# Patient Record
Sex: MALE | Race: WHITE | Employment: OTHER | URBAN - METROPOLITAN AREA
[De-identification: names, ages, dates, MRNs, and addresses within clinical notes are randomized per-mention and may not be internally consistent; named-entity substitution may affect disease eponyms.]

---

## 2017-08-22 PROBLEM — R00.1 BRADYCARDIA: Status: ACTIVE | Noted: 2017-08-22

## 2018-07-03 PROBLEM — I48.0 PAROXYSMAL ATRIAL FIBRILLATION (HCC): Status: ACTIVE | Noted: 2018-07-03

## 2018-09-04 ENCOUNTER — HOSPITAL ENCOUNTER (OUTPATIENT)
Facility: HOSPITAL | Age: 78
Setting detail: HOSPITAL OUTPATIENT SURGERY
Discharge: HOME | End: 2018-09-04
Attending: ORTHOPAEDIC SURGERY | Admitting: ORTHOPAEDIC SURGERY
Payer: MEDICARE

## 2018-09-04 ENCOUNTER — SURGERY (OUTPATIENT)
Age: 78
End: 2018-09-04

## 2018-09-04 ENCOUNTER — ANESTHESIA (OUTPATIENT)
Dept: SURGERY | Facility: HOSPITAL | Age: 78
End: 2018-09-04
Payer: MEDICARE

## 2018-09-04 ENCOUNTER — ANESTHESIA EVENT (OUTPATIENT)
Dept: SURGERY | Facility: HOSPITAL | Age: 78
End: 2018-09-04
Payer: MEDICARE

## 2018-09-04 VITALS
SYSTOLIC BLOOD PRESSURE: 124 MMHG | RESPIRATION RATE: 15 BRPM | TEMPERATURE: 97 F | HEART RATE: 65 BPM | HEIGHT: 71 IN | OXYGEN SATURATION: 95 % | DIASTOLIC BLOOD PRESSURE: 55 MMHG | WEIGHT: 215 LBS | BODY MASS INDEX: 30.1 KG/M2

## 2018-09-04 PROCEDURE — 01U50KZ SUPPLEMENT MEDIAN NERVE WITH NONAUTOLOGOUS TISSUE SUBSTITUTE, OPEN APPROACH: ICD-10-PCS | Performed by: ORTHOPAEDIC SURGERY

## 2018-09-04 PROCEDURE — 01N50ZZ RELEASE MEDIAN NERVE, OPEN APPROACH: ICD-10-PCS | Performed by: ORTHOPAEDIC SURGERY

## 2018-09-04 PROCEDURE — 88304 TISSUE EXAM BY PATHOLOGIST: CPT | Performed by: ORTHOPAEDIC SURGERY

## 2018-09-04 DEVICE — IMPLANTABLE DEVICE: Type: IMPLANTABLE DEVICE | Site: HAND | Status: FUNCTIONAL

## 2018-09-04 RX ORDER — FAMOTIDINE 20 MG/1
20 TABLET ORAL ONCE
Status: COMPLETED | OUTPATIENT
Start: 2018-09-04 | End: 2018-09-04

## 2018-09-04 RX ORDER — HYDROCODONE BITARTRATE AND ACETAMINOPHEN 5; 325 MG/1; MG/1
2 TABLET ORAL AS NEEDED
Status: DISCONTINUED | OUTPATIENT
Start: 2018-09-04 | End: 2018-09-04

## 2018-09-04 RX ORDER — NALOXONE HYDROCHLORIDE 0.4 MG/ML
80 INJECTION, SOLUTION INTRAMUSCULAR; INTRAVENOUS; SUBCUTANEOUS AS NEEDED
Status: DISCONTINUED | OUTPATIENT
Start: 2018-09-04 | End: 2018-09-04

## 2018-09-04 RX ORDER — LIDOCAINE HYDROCHLORIDE AND EPINEPHRINE 10; 10 MG/ML; UG/ML
INJECTION, SOLUTION INFILTRATION; PERINEURAL AS NEEDED
Status: DISCONTINUED | OUTPATIENT
Start: 2018-09-04 | End: 2018-09-04 | Stop reason: HOSPADM

## 2018-09-04 RX ORDER — MORPHINE SULFATE 10 MG/ML
6 INJECTION, SOLUTION INTRAMUSCULAR; INTRAVENOUS EVERY 10 MIN PRN
Status: DISCONTINUED | OUTPATIENT
Start: 2018-09-04 | End: 2018-09-04

## 2018-09-04 RX ORDER — SODIUM CHLORIDE, SODIUM LACTATE, POTASSIUM CHLORIDE, CALCIUM CHLORIDE 600; 310; 30; 20 MG/100ML; MG/100ML; MG/100ML; MG/100ML
INJECTION, SOLUTION INTRAVENOUS CONTINUOUS
Status: DISCONTINUED | OUTPATIENT
Start: 2018-09-04 | End: 2018-09-04

## 2018-09-04 RX ORDER — CEFAZOLIN SODIUM/WATER 2 G/20 ML
2 SYRINGE (ML) INTRAVENOUS ONCE
Status: COMPLETED | OUTPATIENT
Start: 2018-09-04 | End: 2018-09-04

## 2018-09-04 RX ORDER — ONDANSETRON 2 MG/ML
INJECTION INTRAMUSCULAR; INTRAVENOUS AS NEEDED
Status: DISCONTINUED | OUTPATIENT
Start: 2018-09-04 | End: 2018-09-04 | Stop reason: SURG

## 2018-09-04 RX ORDER — HYDROCODONE BITARTRATE AND ACETAMINOPHEN 5; 325 MG/1; MG/1
1 TABLET ORAL AS NEEDED
Status: DISCONTINUED | OUTPATIENT
Start: 2018-09-04 | End: 2018-09-04

## 2018-09-04 RX ORDER — LIDOCAINE HYDROCHLORIDE 10 MG/ML
INJECTION, SOLUTION EPIDURAL; INFILTRATION; INTRACAUDAL; PERINEURAL AS NEEDED
Status: DISCONTINUED | OUTPATIENT
Start: 2018-09-04 | End: 2018-09-04 | Stop reason: SURG

## 2018-09-04 RX ORDER — DEXAMETHASONE SODIUM PHOSPHATE 4 MG/ML
VIAL (ML) INJECTION AS NEEDED
Status: DISCONTINUED | OUTPATIENT
Start: 2018-09-04 | End: 2018-09-04 | Stop reason: SURG

## 2018-09-04 RX ORDER — MIDAZOLAM HYDROCHLORIDE 1 MG/ML
INJECTION INTRAMUSCULAR; INTRAVENOUS AS NEEDED
Status: DISCONTINUED | OUTPATIENT
Start: 2018-09-04 | End: 2018-09-04 | Stop reason: SURG

## 2018-09-04 RX ORDER — MORPHINE SULFATE 4 MG/ML
2 INJECTION, SOLUTION INTRAMUSCULAR; INTRAVENOUS EVERY 10 MIN PRN
Status: DISCONTINUED | OUTPATIENT
Start: 2018-09-04 | End: 2018-09-04

## 2018-09-04 RX ORDER — ONDANSETRON 2 MG/ML
4 INJECTION INTRAMUSCULAR; INTRAVENOUS ONCE AS NEEDED
Status: DISCONTINUED | OUTPATIENT
Start: 2018-09-04 | End: 2018-09-04

## 2018-09-04 RX ORDER — ACETAMINOPHEN 500 MG
1000 TABLET ORAL ONCE
Status: COMPLETED | OUTPATIENT
Start: 2018-09-04 | End: 2018-09-04

## 2018-09-04 RX ORDER — MORPHINE SULFATE 4 MG/ML
4 INJECTION, SOLUTION INTRAMUSCULAR; INTRAVENOUS EVERY 10 MIN PRN
Status: DISCONTINUED | OUTPATIENT
Start: 2018-09-04 | End: 2018-09-04

## 2018-09-04 RX ORDER — METOCLOPRAMIDE 10 MG/1
10 TABLET ORAL ONCE
Status: COMPLETED | OUTPATIENT
Start: 2018-09-04 | End: 2018-09-04

## 2018-09-04 RX ADMIN — DEXAMETHASONE SODIUM PHOSPHATE 4 MG: 4 MG/ML VIAL (ML) INJECTION at 07:46:00

## 2018-09-04 RX ADMIN — LIDOCAINE HYDROCHLORIDE 50 MG: 10 INJECTION, SOLUTION EPIDURAL; INFILTRATION; INTRACAUDAL; PERINEURAL at 07:41:00

## 2018-09-04 RX ADMIN — ONDANSETRON 4 MG: 2 INJECTION INTRAMUSCULAR; INTRAVENOUS at 07:46:00

## 2018-09-04 RX ADMIN — CEFAZOLIN SODIUM/WATER 2 G: 2 G/20 ML SYRINGE (ML) INTRAVENOUS at 07:45:00

## 2018-09-04 RX ADMIN — MIDAZOLAM HYDROCHLORIDE 1 MG: 1 INJECTION INTRAMUSCULAR; INTRAVENOUS at 07:38:00

## 2018-09-04 RX ADMIN — SODIUM CHLORIDE, SODIUM LACTATE, POTASSIUM CHLORIDE, CALCIUM CHLORIDE: 600; 310; 30; 20 INJECTION, SOLUTION INTRAVENOUS at 07:38:00

## 2018-09-04 NOTE — BRIEF OP NOTE
Pre-Operative Diagnosis: Carpal tunnel syndrome right upper limb     Post-Operative Diagnosis: Carpal tunnel syndrome right upper limb      Procedure Performed:   Procedure(s):  Revision right carpal tunnel release, application of antiadhesive nerve protec

## 2018-09-04 NOTE — ANESTHESIA POSTPROCEDURE EVALUATION
Patient: Triston Adair    Procedure Summary     Date:  09/04/18 Room / Location:  [address] [place] [address] MAIN OR 02 / [facility] MAIN OR    Anesthesia Start:  3570 Anesthesia Stop:  3020    Procedure:  WRIST CARPAL TUNNEL RELEASE (Right Wrist) Diagnosis:  (Carpal tunnel syndrome

## 2018-09-04 NOTE — ANESTHESIA PROCEDURE NOTES
Airway  Date/Time: 9/4/2018 7:43 AM  Urgency: elective      General Information and Staff    Patient location during procedure: OR  Anesthesiologist: BOOGIE RAINES  Resident/CRNA: Dariela Hays  Performed: CRNA     Indications and Patient Condition

## 2018-09-04 NOTE — OPERATIVE REPORT
[facility]    PATIENT'S NAME: Wang Zambrano   ATTENDING PHYSICIAN: Socorro Marquez MD   OPERATING PHYSICIAN: Socorro Marquez MD   PATIENT ACCOUNT#:   [id number]    LOCATION:  [facility] [address] PACU [address] [place] [address]  MEDICAL RECORD #:   [id number]       DATE OF exploration of the median nerve with the possibility of requiring nerve grafting was offered to the patient.   The risks of the procedure were explained including bleeding, infection, neurovascular injury, complex regional pain syndrome, as well as anesthet of the median nerve. The palmar cutaneous branch of the median nerve was found to be intact. Adhesions around the median nerve were released until an appropriate external neurolysis was performed of the median nerve.   The motor recurrent branch, radial a horizontal     mattress style fashion. Xeroform, 4 x 4's, sterile Webril, and a volar plastic splint was applied. The patient was then transported to the recovery unit in stable condition.     POSTOPERATIVE PLAN:  Postoperatively, the patient will be disc

## 2018-09-04 NOTE — ANESTHESIA PREPROCEDURE EVALUATION
Anesthesia PreOp Note    HPI:     Paul Cunningham is a 68year old male who presents for preoperative consultation requested by: Anh Capellan MD    Date of Surgery: 9/4/2018    Procedure(s):  WRIST CARPAL TUNNEL RELEASE  Indication: Carpal tunnel syndr (two) times daily. Disp:  Rfl:  9/1/2018 at Unknown time   Multiple Vitamins-Minerals (CENTRUM SILVER 50+MEN OR) Take 1 tablet by mouth daily.  Disp:  Rfl:  9/3/2018 at Unknown time   tamsulosin HCl (FLOMAX) 0.4 MG Oral Cap Take 1 capsule by mouth nightly None on file       Available pre-op labs reviewed. Vital Signs: Body mass index is 29.99 kg/m². height is 1.803 m (5' 11\") and weight is 97.5 kg (215 lb). His oral temperature is 97.8 °F (36.6 °C).  His blood pressure is 143/55 and his pul

## 2018-09-04 NOTE — INTERVAL H&P NOTE
Pre-op Diagnosis: Carpal tunnel syndrome right upper limb    The above referenced H&P was reviewed by Michelle Jalloh MD on 9/4/2018, the patient was examined and no significant changes have occurred in the patient's condition since the H&P was performed.

## (undated) DEVICE — STERILE TETRA-FLEX CF, ELASTIC BANDAGE, 4" X 5.5YD: Brand: TETRA-FLEX™CF

## (undated) DEVICE — SUTURE ETHILON 4-0 699G

## (undated) DEVICE — INTENDED FOR TISSUE SEPARATION, AND OTHER PROCEDURES THAT REQUIRE A SHARP SURGICAL BLADE TO PUNCTURE OR CUT.: Brand: BARD-PARKER ® STAINLESS STEEL BLADES

## (undated) DEVICE — SUTURE ETHILON 4-0 1667G

## (undated) DEVICE — EYE SPEARS: Brand: WECK-CEL

## (undated) DEVICE — SPLINT PRECUT ORTHOGLASS 4X15

## (undated) DEVICE — IMPLANTABLE DEVICE: Type: IMPLANTABLE DEVICE | Site: HAND

## (undated) DEVICE — ZIMMER® STERILE DISPOSABLE TOURNIQUET CUFF WITH PLC, DUAL PORT, DUAL BLADDER, 18 IN. (46 CM)

## (undated) DEVICE — NON-ADHERENT PADS PREPACK: Brand: TELFA

## (undated) DEVICE — CHLORAPREP 26ML APPLICATOR

## (undated) DEVICE — TOWEL OR BLU 16X26 STRL

## (undated) DEVICE — GLOVE SRG BIOGEL 8.0

## (undated) DEVICE — SPECIALTY ARM SLING: Brand: DEROYAL

## (undated) DEVICE — CASED DISP BIPOLAR CORD

## (undated) DEVICE — TONGUE DEPRESSOR 6IN STR

## (undated) DEVICE — SUTURE PROLENE 7-0 BV-1

## (undated) DEVICE — BLADE 11 SHRP BP SS SRG STRL

## (undated) DEVICE — 1010 S-DRAPE TOWEL DRAPE 10/BX: Brand: STERI-DRAPE™

## (undated) DEVICE — UPPER EXTREMITY: Brand: MEDLINE INDUSTRIES, INC.

## (undated) DEVICE — SUTURE MONOCRYL 3-0 Y936H

## (undated) DEVICE — SOL  .9 1000ML BTL

## (undated) DEVICE — SUTURE ETHILON 9-0 BV130-3

## (undated) DEVICE — SUCTION CANISTER, 3000CC,SAFELINER: Brand: DEROYAL